# Patient Record
Sex: MALE | Race: BLACK OR AFRICAN AMERICAN | NOT HISPANIC OR LATINO | Employment: PART TIME | URBAN - METROPOLITAN AREA
[De-identification: names, ages, dates, MRNs, and addresses within clinical notes are randomized per-mention and may not be internally consistent; named-entity substitution may affect disease eponyms.]

---

## 2018-07-23 ENCOUNTER — HOSPITAL ENCOUNTER (EMERGENCY)
Facility: HOSPITAL | Age: 43
Discharge: HOME | End: 2018-07-23
Attending: EMERGENCY MEDICINE
Payer: MEDICAID

## 2018-07-23 VITALS
RESPIRATION RATE: 16 BRPM | BODY MASS INDEX: 31.81 KG/M2 | TEMPERATURE: 98 F | SYSTOLIC BLOOD PRESSURE: 136 MMHG | HEART RATE: 74 BPM | DIASTOLIC BLOOD PRESSURE: 81 MMHG | WEIGHT: 240 LBS | OXYGEN SATURATION: 97 % | HEIGHT: 73 IN

## 2018-07-23 DIAGNOSIS — N21.0 BLADDER STONE: Primary | ICD-10-CM

## 2018-07-23 LAB
BILIRUB UR QL STRIP: NEGATIVE
BUN SERPL-MCNC: 11 MG/DL (ref 6–30)
CHLORIDE SERPL-SCNC: 106 MMOL/L (ref 95–110)
CLARITY UR REFRACT.AUTO: CLEAR
COLOR UR AUTO: YELLOW
CREAT SERPL-MCNC: 1.3 MG/DL (ref 0.5–1.4)
GLUCOSE SERPL-MCNC: 106 MG/DL (ref 70–110)
GLUCOSE UR QL STRIP: NEGATIVE
HCT VFR BLD CALC: 46 %PCV (ref 36–54)
HGB UR QL STRIP: ABNORMAL
KETONES UR QL STRIP: NEGATIVE
LEUKOCYTE ESTERASE UR QL STRIP: NEGATIVE
MICROSCOPIC COMMENT: ABNORMAL
NITRITE UR QL STRIP: NEGATIVE
PH UR STRIP: 5 [PH] (ref 5–8)
POC IONIZED CALCIUM: 1.22 MMOL/L (ref 1.06–1.42)
POC TCO2 (MEASURED): 25 MMOL/L (ref 23–29)
POTASSIUM BLD-SCNC: 4.2 MMOL/L (ref 3.5–5.1)
PROT UR QL STRIP: NEGATIVE
RBC #/AREA URNS AUTO: 15 /HPF (ref 0–4)
SAMPLE: NORMAL
SODIUM BLD-SCNC: 142 MMOL/L (ref 136–145)
SP GR UR STRIP: 1.01 (ref 1–1.03)
URN SPEC COLLECT METH UR: ABNORMAL
UROBILINOGEN UR STRIP-ACNC: 4 EU/DL
WBC #/AREA URNS AUTO: 1 /HPF (ref 0–5)

## 2018-07-23 PROCEDURE — 25000003 PHARM REV CODE 250: Performed by: EMERGENCY MEDICINE

## 2018-07-23 PROCEDURE — 96375 TX/PRO/DX INJ NEW DRUG ADDON: CPT

## 2018-07-23 PROCEDURE — 96374 THER/PROPH/DIAG INJ IV PUSH: CPT

## 2018-07-23 PROCEDURE — 99284 EMERGENCY DEPT VISIT MOD MDM: CPT | Mod: 25

## 2018-07-23 PROCEDURE — 96361 HYDRATE IV INFUSION ADD-ON: CPT

## 2018-07-23 PROCEDURE — 81001 URINALYSIS AUTO W/SCOPE: CPT

## 2018-07-23 PROCEDURE — 99284 EMERGENCY DEPT VISIT MOD MDM: CPT | Mod: ,,, | Performed by: EMERGENCY MEDICINE

## 2018-07-23 PROCEDURE — 63600175 PHARM REV CODE 636 W HCPCS: Performed by: EMERGENCY MEDICINE

## 2018-07-23 RX ORDER — ONDANSETRON 8 MG/1
8 TABLET, ORALLY DISINTEGRATING ORAL EVERY 12 HOURS PRN
Qty: 6 TABLET | Refills: 0 | Status: SHIPPED | OUTPATIENT
Start: 2018-07-23 | End: 2018-07-26

## 2018-07-23 RX ORDER — ONDANSETRON 2 MG/ML
4 INJECTION INTRAMUSCULAR; INTRAVENOUS
Status: COMPLETED | OUTPATIENT
Start: 2018-07-23 | End: 2018-07-23

## 2018-07-23 RX ORDER — IBUPROFEN 400 MG/1
400 TABLET ORAL
Status: DISCONTINUED | OUTPATIENT
Start: 2018-07-23 | End: 2018-07-23

## 2018-07-23 RX ORDER — KETOROLAC TROMETHAMINE 30 MG/ML
15 INJECTION, SOLUTION INTRAMUSCULAR; INTRAVENOUS
Status: COMPLETED | OUTPATIENT
Start: 2018-07-23 | End: 2018-07-23

## 2018-07-23 RX ORDER — TAMSULOSIN HYDROCHLORIDE 0.4 MG/1
0.4 CAPSULE ORAL
Status: COMPLETED | OUTPATIENT
Start: 2018-07-23 | End: 2018-07-23

## 2018-07-23 RX ORDER — TAMSULOSIN HYDROCHLORIDE 0.4 MG/1
0.4 CAPSULE ORAL DAILY
Qty: 30 CAPSULE | Refills: 0 | Status: SHIPPED | OUTPATIENT
Start: 2018-07-23 | End: 2019-07-23

## 2018-07-23 RX ORDER — HYDROCODONE BITARTRATE AND ACETAMINOPHEN 5; 325 MG/1; MG/1
1 TABLET ORAL EVERY 4 HOURS PRN
Qty: 5 TABLET | Refills: 0 | Status: SHIPPED | OUTPATIENT
Start: 2018-07-23

## 2018-07-23 RX ADMIN — KETOROLAC TROMETHAMINE 15 MG: 30 INJECTION, SOLUTION INTRAMUSCULAR at 07:07

## 2018-07-23 RX ADMIN — ONDANSETRON 4 MG: 2 INJECTION, SOLUTION INTRAMUSCULAR; INTRAVENOUS at 07:07

## 2018-07-23 RX ADMIN — TAMSULOSIN HYDROCHLORIDE 0.4 MG: 0.4 CAPSULE ORAL at 07:07

## 2018-07-23 RX ADMIN — SODIUM CHLORIDE 1000 ML: 0.9 INJECTION, SOLUTION INTRAVENOUS at 07:07

## 2018-07-23 NOTE — ED TRIAGE NOTES
Pt reports L flank pain that began around 3 AM. Denies N/V/D, pain with urination. No other complaints    Patient Identifiers for Tanvir Hernandez checked and correct  LOC: The patient is awake, alert and aware of environment with an appropriate affect, the patient is oriented x 3 and speaking appropriate.  APPEARANCE: Patient appears uncomfortable but in no acute distress, patient is clean and well groomed, patient's clothing is properly fastened.  SKIN: The skin is warm and dry, patient has normal skin turgor and moist mucus membranes,no rashes or lesions.Skin Intact , No Breakdown Noted  Musculoskeletal :  Normal range of motion noted. Moves all extremeties well, No swelling or tenderness noted  RESPIRATORY: Airway is open and patent, respirations are spontaneous, patient has a normal effort and rate.  CARDIAC: Patient has a normal rate and rhythm, no periphreal edema noted, capillary refill < 3 seconds.   ABDOMEN: Soft and non tender to palpation, no distention noted.   PULSES: 2+  And symmetrical in all extremeties  NEUROLOGIC: PERRL. facial expression is symmetrical, patient moving all extremities, normal sensation in all extremities when touched with a finger.The patient is awake, alert and cooperative with a calm affect, patient is aware of environment.    Will continue to monitor

## 2018-07-23 NOTE — ED PROVIDER NOTES
Encounter Date: 7/23/2018    SCRIBE #1 NOTE: I, Candy German, am scribing for, and in the presence of,  Dr. Delgado. I have scribed the following portions of the note - the Resident attestation.       History     Chief Complaint   Patient presents with    Flank Pain     Left flank pain starting this morning. Denies N/V or urinary changes.      Mr. Hernandez is a 42 year old male who presents to the ED for evaluation of left sided flank pain. Pain woke the patient from sleep last night. He characterizes it as sharp 10/10 and located on the left posteriolateral aspect of the back. He denies pain shooting down leg or traveling anywhere else. It is exacerbated by movement and certain positioning. 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 Last night he was able to walk around and pain was reduced but returned overnight and is now constant. He reports one incident in the past over the same area that resolved with ice and heat. He denies dysuria or hematuria, fevers and chills. He denies any past medical history and takes no medications regularly. Denies history of bone or connective tissue disorders.           Review of patient's allergies indicates:  No Known Allergies  No past medical history on file.  No past surgical history on file.  No family history on file.  Social History   Substance Use Topics    Smoking status: Not on file    Smokeless tobacco: Not on file    Alcohol use Not on file     Review of Systems   Constitutional: Negative for chills, fever and unexpected weight change.   HENT: Negative for sore throat.    Eyes: Negative for visual disturbance.   Respiratory: Negative for cough and shortness of breath.    Cardiovascular: Negative for chest pain and palpitations.   Gastrointestinal: Negative for abdominal pain, constipation, diarrhea, nausea and vomiting.   Genitourinary: Negative for difficulty urinating.   Musculoskeletal: Positive for back pain. Negative  for gait problem.   Skin: Negative for rash and wound.   Neurological: Negative for weakness and headaches.   Hematological: Negative for adenopathy.       Physical Exam     Initial Vitals [07/23/18 0628]   BP Pulse Resp Temp SpO2   (!) 145/88 73 20 98 °F (36.7 °C) 97 %      MAP       --         Physical Exam    Constitutional: He appears well-developed and well-nourished. No distress.   HENT:   Head: Normocephalic and atraumatic.   Eyes: Conjunctivae and EOM are normal. Pupils are equal, round, and reactive to light.   Neck: Normal range of motion. Neck supple.   Cardiovascular: Normal rate, regular rhythm, normal heart sounds and intact distal pulses.   No murmur heard.  Pulmonary/Chest: Breath sounds normal. No respiratory distress. He has no wheezes. He has no rales.   Abdominal: Soft. Bowel sounds are normal. He exhibits no distension. There is no guarding.   Musculoskeletal: He exhibits no edema.        Arms:  Lymphadenopathy:     He has no cervical adenopathy.   Neurological: He is alert and oriented to person, place, and time. No cranial nerve deficit.   Skin: Skin is warm and dry.         ED Course   Procedures  Labs Reviewed   URINALYSIS, REFLEX TO URINE CULTURE - Abnormal; Notable for the following:        Result Value    Occult Blood UA 2+ (*)     All other components within normal limits    Narrative:     Preferred Collection Type->Urine, Clean Catch One urine cup   URINALYSIS MICROSCOPIC - Abnormal; Notable for the following:     RBC, UA 15 (*)     All other components within normal limits    Narrative:     Preferred Collection Type->Urine, Clean Catch One urine cup   ISTAT PROCEDURE          Imaging Results          CT Renal Stone Study ABD Pelvis WO (Final result)  Result time 07/23/18 10:38:51    Final result by James Mckinnon MD (07/23/18 10:38:51)                 Impression:      There is a tiny calcification in the bladder at the side of the left ureter insertion consistent with recent passage of  a stone.    Both kidneys are normal in size with no evidence of renal masses, hydronephrosis or hydroureter.    Electronically signed by resident: Zander Mendez MD  Date:    07/23/2018  Time:    10:08    Electronically signed by: James Mckinnon MD  Date:    07/23/2018  Time:    10:38             Narrative:    EXAMINATION:  CT RENAL STONE STUDY ABD PELVIS WO    CLINICAL HISTORY:  Flank pain, stone disease suspected;    TECHNIQUE:  Low dose axial images, sagittal and coronal reformations were obtained from the lung bases to the pubic symphysis.  Contrast was not administered.    COMPARISON:  None    FINDINGS:  Heart: Normal in size.  No pericardial effusion.    Lung Bases: Well aerated, without consolidation or pleural fluid.    Liver: Normal in size and attenuation, with no focal hepatic lesions.    Gallbladder: No calcified gallstones.    Bile Ducts: No evidence of dilated ducts.    The gallbladder, pancreas, spleen, and the adrenals are all unremarkable.    Both kidneys are normal in size with no evidence of renal masses, hydronephrosis or hydroureter.  There is a tiny calcification in the bladder at the side of the left ureter insertion consistent with recent passage of a stone.    Bladder: No evidence of wall thickening.    Reproductive organs: Unremarkable.    GI Tract/Mesentery: No evidence of bowel obstruction or inflammation.    Peritoneal Space: No ascites. No free air.    Retroperitoneum: No significant adenopathy.    Abdominal wall: Unremarkable.    Vasculature: No significant atherosclerosis or aneurysm.    Bones: No acute fracture.                                 Medical Decision Making:   History:   Old Medical Records: I decided to obtain old medical records.  Clinical Tests:   Lab Tests: Ordered and Reviewed  Radiological Study: Ordered and Reviewed       APC / Resident Notes:   Evaluation of left flank pain in 42 year old man with no significant past medical history. Urinalysis in ED significant for 2+  blood confirmed on microscopy. Differential diagnosis includes renal calculi vs. msk strain. Fracture is very unlikely. Differential also includes AAA.    CT Scan indicates small calculus in bladder, no other acute processes.    Patient OK to discharge. Given flomax, zofran, and 5 norco for analgesia. Instructed to follow up if stone does not pass within the next few days or return to ED if symptoms worsen or he develops fevers/chills.    Patient seen and discussed with attending: Dr. Sandy Johnson MD  Internal Medicine, PGY1           Scribe Attestation:   Scribe #1: I performed the above scribed service and the documentation accurately describes the services I performed. I attest to the accuracy of the note.    Attending Attestation:   Physician Attestation Statement for Resident:  As the supervising MD   Physician Attestation Statement: I have personally seen and examined this patient.   I agree with the above history. -:   As the supervising MD I agree with the above PE.    As the supervising MD I agree with the above treatment, course, plan, and disposition.            Attending ED Notes:   Evaluation of left sided flank pain, intermittent and severe. Symptoms improved with IV fluids and medication. Urinalysis without infection. Creatinine within normal limits. CT scan was bladder stone. Will discharge with pain medication, nausea medication and flomax.             Clinical Impression:   The encounter diagnosis was Bladder stone.      Disposition:   Disposition: Discharged  Condition: Stable                        Bethanyari Cammie Delgado MD  07/25/18 1026

## 2018-07-24 ENCOUNTER — NURSE TRIAGE (OUTPATIENT)
Dept: ADMINISTRATIVE | Facility: CLINIC | Age: 43
End: 2018-07-24

## 2018-07-24 NOTE — TELEPHONE ENCOUNTER
Reason for Disposition   Caller has medication question, adult has minor symptoms, caller declines triage, and triager answers question    Protocols used: ST MEDICATION QUESTION CALL-A-AH

## 2022-07-12 ENCOUNTER — HOSPITAL ENCOUNTER (EMERGENCY)
Facility: HOSPITAL | Age: 47
Discharge: HOME | End: 2022-07-12
Attending: EMERGENCY MEDICINE
Payer: MEDICAID

## 2022-07-12 VITALS
BODY MASS INDEX: 38.16 KG/M2 | HEIGHT: 73 IN | TEMPERATURE: 98 F | HEART RATE: 88 BPM | WEIGHT: 287.94 LBS | OXYGEN SATURATION: 97 % | RESPIRATION RATE: 18 BRPM | SYSTOLIC BLOOD PRESSURE: 146 MMHG | DIASTOLIC BLOOD PRESSURE: 91 MMHG

## 2022-07-12 DIAGNOSIS — U07.1 COVID-19 VIRUS INFECTION: Primary | ICD-10-CM

## 2022-07-12 DIAGNOSIS — B34.9 ACUTE VIRAL SYNDROME: ICD-10-CM

## 2022-07-12 DIAGNOSIS — U07.1 COVID-19 VIRUS DETECTED: ICD-10-CM

## 2022-07-12 LAB
CTP QC/QA: YES
SARS-COV-2 RDRP RESP QL NAA+PROBE: POSITIVE

## 2022-07-12 PROCEDURE — U0002 COVID-19 LAB TEST NON-CDC: HCPCS | Performed by: NURSE PRACTITIONER

## 2022-07-12 PROCEDURE — 99284 EMERGENCY DEPT VISIT MOD MDM: CPT | Mod: CR,CS,, | Performed by: EMERGENCY MEDICINE

## 2022-07-12 PROCEDURE — 99282 EMERGENCY DEPT VISIT SF MDM: CPT

## 2022-07-12 PROCEDURE — 99284 PR EMERGENCY DEPT VISIT,LEVEL IV: ICD-10-PCS | Mod: CR,CS,, | Performed by: EMERGENCY MEDICINE

## 2022-07-12 NOTE — DISCHARGE INSTRUCTIONS
Take motrin and tylenol over the counter as directed on packaging as needed for pain or fever.     Home quarantine for 5 days.    Our goal in the emergency department is to always give you outstanding care and exceptional service. You may receive a survey by mail or e-mail in the next week regarding your experience in our ED. We would greatly appreciate your completing and returning the survey. Your feedback provides us with a way to recognize our staff who give very good care and it helps us learn how to improve when your experience was below our aspiration of excellence.

## 2022-07-12 NOTE — Clinical Note
"Tanvir"Wilberto Hernandez was seen and treated in our emergency department on 7/12/2022.     COVID-19 is present in our communities across the state. There is limited testing for COVID at this time, so not all patients can be tested. In this situation, your employee meets the following criteria:    Tanvir Hernandez has met the criteria for COVID-19 testing and has a POSITIVE result. He can return to work once they are asymptomatic for 24 hours without the use of fever reducing medications AND at least five days from the first positive result. A mask is recommended for 5 days post quarantine.     If you have any questions or concerns, or if I can be of further assistance, please do not hesitate to contact me.    Sincerely,             Michael Lamb MD"

## 2022-07-12 NOTE — Clinical Note
Wife of Tanvir Hernandez accompanied their spouse to the emergency department on 7/12/2022. They may return to work on 07/13/2022.      If you have any questions or concerns, please don't hesitate to call.      Michael Lamb MD

## 2022-07-12 NOTE — FIRST PROVIDER EVALUATION
Emergency Department TeleTriage Encounter Note      CHIEF COMPLAINT    Chief Complaint   Patient presents with    Covid Positive     ++ home test,  hot and cold, body aches       VITAL SIGNS   Initial Vitals [07/12/22 0922]   BP Pulse Resp Temp SpO2   (!) 146/91 88 18 98.4 °F (36.9 °C) 97 %      MAP       --            ALLERGIES    Review of patient's allergies indicates:  No Known Allergies    PROVIDER TRIAGE NOTE  This is a teletriage evaluation of a 46 y.o. male presenting to the ED complaining of body aches and chills starting yesterday. +COVID test today. Denies CP and SOB.      Well-appearing, no resp distress.     Initial orders will be placed and care will be transferred to an alternate provider when patient is roomed for a full evaluation. Any additional orders and the final disposition will be determined by that provider.           ORDERS  Labs Reviewed   HIV 1 / 2 ANTIBODY   HEPATITIS C ANTIBODY   SARS-COV-2 RDRP GENE       ED Orders (720h ago, onward)    Start Ordered     Status Ordering Provider    07/12/22 1013 07/12/22 1012  POCT COVID-19 Rapid Screening  Once         Ordered GILMAR MURPHY N.    07/12/22 1013 07/12/22 1012  Airborne and Contact and Droplet Isolation Status  Continuous         Ordered GILMAR MURPHY N.    07/12/22 0926 07/12/22 0926  HIV 1/2 Ag/Ab (4th Gen)  STAT         Ordered SHARON VIRGEN    07/12/22 0926 07/12/22 0926  Hepatitis C Antibody  STAT         Ordered SHARON VIRGEN            Virtual Visit Note: The provider triage portion of this emergency department evaluation and documentation was performed via [employer], a HIPAA-compliant telemedicine application, in concert with a tele-presenter in the room. A face to face patient evaluation with one of my colleagues will occur once the patient is placed in an emergency department room.      DISCLAIMER: This note was prepared with M*Modal voice recognition transcription software. Garbled syntax, mangled  pronouns, and other bizarre constructions may be attributed to that software system.

## 2022-07-14 NOTE — ED PROVIDER NOTES
Encounter Date: 7/12/2022       History     Chief Complaint   Patient presents with    Covid Positive     ++ home test,  hot and cold, body aches     46-year-old male presents with known COVID infection.  Tested positive at home.  Patient has had chills and body aches.  He needs a note for work.  He denies nausea, vomiting, diarrhea, shortness of breath, chest pain, abdominal pain, or dysuria.  His symptoms are improved today with over-the-counter medication.  A ten point review of systems was completed and is negative except as documented above.  Patient denies any other acute medical complaint.  The patients available PMH, PSH, Social History, medications, allergies, and triage vital signs were reviewed just prior to their medical evaluation.        Review of patient's allergies indicates:  No Known Allergies  No past medical history on file.  No past surgical history on file.  No family history on file.     Review of Systems   Constitutional: Positive for chills. Negative for fever.   HENT: Negative for sore throat.    Eyes: Negative for visual disturbance.   Respiratory: Positive for cough. Negative for shortness of breath.    Cardiovascular: Negative for chest pain.   Gastrointestinal: Negative for abdominal pain, diarrhea, nausea and vomiting.   Genitourinary: Negative for dysuria.   Musculoskeletal: Positive for myalgias. Negative for neck pain.   Skin: Negative for rash and wound.   Allergic/Immunologic: Negative for immunocompromised state.   Neurological: Negative for syncope.   Psychiatric/Behavioral: Negative for confusion.       Physical Exam     Initial Vitals [07/12/22 0922]   BP Pulse Resp Temp SpO2   (!) 146/91 88 18 98.4 °F (36.9 °C) 97 %      MAP       --         Physical Exam    Nursing note and vitals reviewed.  Constitutional: He appears well-developed and well-nourished. He is not diaphoretic. No distress.   HENT:   Head: Normocephalic and atraumatic.   Nose: Nose normal.   Eyes: Conjunctivae  are normal. Right eye exhibits no discharge. Left eye exhibits no discharge.   Neck: Neck supple.   Normal range of motion.  Cardiovascular: Normal rate, regular rhythm and normal heart sounds. Exam reveals no gallop and no friction rub.    No murmur heard.  Pulmonary/Chest: Breath sounds normal. No respiratory distress. He has no wheezes. He has no rhonchi. He has no rales.   Abdominal: Abdomen is soft. He exhibits no distension. There is no abdominal tenderness. There is no rebound and no guarding.   Musculoskeletal:         General: No tenderness or edema. Normal range of motion.      Cervical back: Normal range of motion and neck supple.     Neurological: He is alert and oriented to person, place, and time. GCS score is 15. GCS eye subscore is 4. GCS verbal subscore is 5. GCS motor subscore is 6.   Skin: Skin is warm and dry. No rash noted. No erythema.   Psychiatric: He has a normal mood and affect. His behavior is normal. Judgment and thought content normal.         ED Course   Procedures  Labs Reviewed   SARS-COV-2 RDRP GENE - Abnormal; Notable for the following components:       Result Value    POC Rapid COVID Positive (*)     All other components within normal limits    Narrative:     This test utilizes isothermal nucleic acid amplification   technology to detect the SARS-CoV-2 RdRp nucleic acid segment.   The analytical sensitivity (limit of detection) is 125 genome   equivalents/mL.   A POSITIVE result implies infection with the SARS-CoV-2 virus;   the patient is presumed to be contagious.     A NEGATIVE result means that SARS-CoV-2 nucleic acids are not   present above the limit of detection. A NEGATIVE result should be   treated as presumptive. It does not rule out the possibility of   COVID-19 and should not be the sole basis for treatment decisions.   If COVID-19 is strongly suspected based on clinical and exposure   history, re-testing using an alternate molecular assay should be   considered.   This  test is only for use under the Food and Drug   Administration s Emergency Use Authorization (EUA).   Commercial kits are provided by "[employer].   Performance characteristics of the EUA have been independently   verified by [facility] Department of   Pathology and Laboratory Medicine.   _________________________________________________________________   The authorized Fact Sheet for Healthcare Providers and the authorized Fact   Sheet for Patients of the ID NOW COVID-19 are available on the FDA   website:     https://www.fda.gov/media/349121/download  https://www.fda.gov/media/579828/download                 Imaging Results    None          Medications - No data to display  Medical Decision Making:   History:   I obtained history from: someone other than patient.       <> Summary of History: Wife assists HPI  Old Medical Records: I decided to obtain old medical records.  Clinical Tests:   Lab Tests: Ordered and Reviewed  ED Management:  46-year-old male presents with COVID infection and viral syndrome.  Vitals unremarkable.  Physical exam benign.  No hypoxia.  No indication for further labs or imaging.  Provided work note.  Will discharge home to quarantine.  Patient will take motrin and tylenol over the counter as directed on packaging.  Patient will return to ED for worsening symptoms, inability to eat/drink, fever greater than 100.4, or any other concerns.  Did bedside teaching with return precautions.  All questions answered.  The patient acknowledges understanding.  Gave verbal discharge instructions.                      Clinical Impression:   Final diagnoses:  [U07.1] COVID-19 virus infection (Primary)  [B34.9] Acute viral syndrome          ED Disposition Condition    Discharge Stable        ED Prescriptions     None        Follow-up Information     Follow up With Specialties Details Why Contact [facility] - Emergency Dept Emergency Medicine  Return to ED for worsening symptoms,  inability to eat/drink, fever greater than 100.4, or any other concerns. [address]  [facility] [id number] [phone]           Michael Lamb MD  07/14/22 5038

## 2023-03-21 ENCOUNTER — HOSPITAL ENCOUNTER (EMERGENCY)
Facility: HOSPITAL | Age: 48
Discharge: HOME | End: 2023-03-21
Attending: EMERGENCY MEDICINE
Payer: MEDICAID

## 2023-03-21 VITALS
WEIGHT: 287 LBS | SYSTOLIC BLOOD PRESSURE: 128 MMHG | DIASTOLIC BLOOD PRESSURE: 83 MMHG | RESPIRATION RATE: 16 BRPM | TEMPERATURE: 99 F | OXYGEN SATURATION: 98 % | HEART RATE: 105 BPM | BODY MASS INDEX: 37.87 KG/M2

## 2023-03-21 DIAGNOSIS — K52.9 GASTROENTERITIS: ICD-10-CM

## 2023-03-21 DIAGNOSIS — R06.02 SOB (SHORTNESS OF BREATH): ICD-10-CM

## 2023-03-21 DIAGNOSIS — K21.9 GASTROESOPHAGEAL REFLUX DISEASE WITHOUT ESOPHAGITIS: Primary | ICD-10-CM

## 2023-03-21 PROCEDURE — 93005 ELECTROCARDIOGRAM TRACING: CPT

## 2023-03-21 PROCEDURE — 99283 EMERGENCY DEPT VISIT LOW MDM: CPT

## 2023-03-21 PROCEDURE — 93010 ELECTROCARDIOGRAM REPORT: CPT | Mod: ,,, | Performed by: INTERNAL MEDICINE

## 2023-03-21 PROCEDURE — 99284 PR EMERGENCY DEPT VISIT,LEVEL IV: ICD-10-PCS | Mod: ,,, | Performed by: EMERGENCY MEDICINE

## 2023-03-21 PROCEDURE — 93010 EKG 12-LEAD: ICD-10-PCS | Mod: ,,, | Performed by: INTERNAL MEDICINE

## 2023-03-21 PROCEDURE — 99284 EMERGENCY DEPT VISIT MOD MDM: CPT | Mod: ,,, | Performed by: EMERGENCY MEDICINE

## 2023-03-21 NOTE — Clinical Note
"Tanvir Elliscl Hernandez was seen and treated in our emergency department on 3/21/2023.  He may return to work on 03/22/2023.       If you have any questions or concerns, please don't hesitate to call.      Luciano Sewell III, MD"

## 2023-03-21 NOTE — ED TRIAGE NOTES
Pt reports having SOB since Sunday with diarrhea, acid reflux, and mild epigastric pain. Pt denies fever, chills, or cough. Pt denies past medical hx. Pt AAOx4.

## 2023-03-21 NOTE — ED PROVIDER NOTES
Encounter Date: 3/21/2023    SCRIBE #1 NOTE: I, Michelle Salazar, am scribing for, and in the presence of,  Luciano Sewell III, MD. I have scribed the following portions of the note - the EKG reading. Other sections scribed: HPI, ROS, PE.     History     Chief Complaint   Patient presents with    Shortness of Breath     And lightheadedness since Sunday.  Also reports diarrhea.     Time patient was seen by the provider: 9:44 AM      The patient is a 47 y.o. male with past medical history of GERD who presents to the ED with multiple complaints. The patient developed fatigue, lightheadedness, and diarrhea 2 days ago. He went to work on Monday and notes that his symptoms persisted. The patient began to belch last night and reports that this is similar to when he had acid reflux in the past. His symptoms did not worsen after eating an orange slice . The patient's fluid intake has decreased over the last few days. He reports shortness of breath on exertion since the onset of his symptoms. However, he denies SOB at the moment. The patient's last BM was last night. His diarrhea has improved today. No history of CHF or ulcers. He is a non-smoker. The patient is employed as a . Patient denies hemoptysis, blood in stool, or dark/tarry stool.    The history is provided by the patient and medical records. No  was used.     Review of patient's allergies indicates:  No Known Allergies  History reviewed. No pertinent past medical history.  No past surgical history on file.  No family history on file.       Review of Systems   Constitutional:  Positive for fatigue. Negative for fever.   HENT:  Negative for sore throat.    Respiratory:  Positive for shortness of breath (on exertion).         Negative for hemoptysis   Cardiovascular:  Negative for chest pain.   Gastrointestinal:  Positive for diarrhea. Negative for blood in stool and nausea.        Negative for black/tarry stool   Genitourinary:   Negative for dysuria.   Musculoskeletal:  Negative for back pain.   Skin:  Negative for rash.   Neurological:  Positive for light-headedness. Negative for weakness.   Hematological:  Does not bruise/bleed easily.     Physical Exam     Initial Vitals [03/21/23 0852]   BP Pulse Resp Temp SpO2   128/83 105 16 98.7 °F (37.1 °C) 98 %      MAP       --         Physical Exam    Nursing note and vitals reviewed.    Appearance: No acute distress.  Skin: No rashes seen.  Good turgor.  No abrasions.  No ecchymoses.  Eyes: No conjunctival injection.  ENT: Oropharynx clear.    Chest: Clear to auscultation bilaterally.  Good air movement.  No wheezes.  No rhonchi.  Cardiovascular: Regular rate and rhythm.  No murmurs. No gallops. No rubs.  Abdomen: Soft.  Not distended.  Nontender.  No guarding.  No rebound.  Musculoskeletal: Good range of motion all joints.  No deformities.  Neck supple.  No meningismus.  Neurologic: Motor intact.  Sensation intact.  Cerebellar intact.  Cranial nerves intact.  Mental Status:  Alert and oriented x 3.  Appropriate, conversant.      ED Course   Procedures  Labs Reviewed - No data to display    EKG Readings: (Independently Interpreted)   Rhythm: Normal Sinus Rhythm. Heart Rate: 99.   Normal intervals.  No acute ST elevations or changes.   ECG Results              EKG 12-lead (Final result)  Result time 03/21/23 15:07:18      Final result by Interface, Lab In [facility] (03/21/23 15:07:18)                   Narrative:    Test Reason : R06.02,    Vent. Rate : 099 BPM     Atrial Rate : 099 BPM     P-R Int : 162 ms          QRS Dur : 098 ms      QT Int : 346 ms       P-R-T Axes : 038 070 031 degrees     QTc Int : 444 ms    Normal sinus rhythm  Right ventricular conduction delay  Cannot rule out Anterior infarct ,age undetermined  Abnormal ECG  No previous ECGs available  Confirmed by Alley Fernandez MD ([id number]) on 3/21/2023 3:07:10 PM    Referred By:             Confirmed By:Alley Fernandez MD                                   Imaging Results    None          Medications - No data to display  Medical Decision Making:   History:   Old Medical Records: I decided to obtain old medical records.  Clinical Tests:   Medical Tests: Ordered and Reviewed  ED Management:  Patient presents emergency department secondary to diarrhea, acid reflux and some shortness of breath.  Patient states since he became sick on Sunday, he is had decreased energy and some shortness of breath with exertion.  He denies any chest pain or swelling.  He has no history of CHF.  On presentation emergency department he is not dyspneic, and he has no exertional shortness of breath when I ambulate him around the room.  His physical exam is unremarkable for an acute abdomen, and there are no crackles in his lungs to suggest CHF nor is there any peripheral edema.  Patient was advised of the likely diagnosis of reflux/gastroenteritis.  EKG was unremarkable.  I advised him to have a bland diet, and take over-the-counter omeprazole if indicated.  Also told him to keep his diet bland as he may have some extended diarrhea with this. He was given a work note and he verbalizes understanding agreement with the plan and was discharged in stable condition.        Scribe Attestation:   Scribe #1: I performed the above scribed service and the documentation accurately describes the services I performed. I attest to the accuracy of the note.                   Clinical Impression:   Final diagnoses:  [R06.02] SOB (shortness of breath)  [K21.9] Gastroesophageal reflux disease without esophagitis (Primary)  [K52.9] Gastroenteritis        ED Disposition Condition    Discharge Stable          ED Prescriptions    None       Follow-up Information    None          Luciano Sewell III, MD  03/22/23 [id number]

## 2023-04-11 ENCOUNTER — PATIENT MESSAGE (OUTPATIENT)
Dept: RESEARCH | Facility: HOSPITAL | Age: 48
End: 2023-04-11
Payer: MEDICAID